# Patient Record
Sex: MALE | Race: WHITE
[De-identification: names, ages, dates, MRNs, and addresses within clinical notes are randomized per-mention and may not be internally consistent; named-entity substitution may affect disease eponyms.]

---

## 2021-08-04 ENCOUNTER — HOSPITAL ENCOUNTER (OUTPATIENT)
Dept: HOSPITAL 33 - ED | Age: 71
Setting detail: OBSERVATION
LOS: 3 days | Discharge: HOME | End: 2021-08-07
Attending: FAMILY MEDICINE | Admitting: FAMILY MEDICINE
Payer: OTHER GOVERNMENT

## 2021-08-04 DIAGNOSIS — J44.1: Primary | ICD-10-CM

## 2021-08-04 DIAGNOSIS — I10: ICD-10-CM

## 2021-08-04 DIAGNOSIS — E78.00: ICD-10-CM

## 2021-08-04 DIAGNOSIS — R09.02: ICD-10-CM

## 2021-08-04 DIAGNOSIS — C91.10: ICD-10-CM

## 2021-08-04 DIAGNOSIS — Z20.828: ICD-10-CM

## 2021-08-04 DIAGNOSIS — Z79.899: ICD-10-CM

## 2021-08-04 LAB
ALBUMIN SERPL-MCNC: 3.7 G/DL (ref 3.5–5)
ALP SERPL-CCNC: 84 U/L (ref 38–126)
ALT SERPL-CCNC: 42 U/L (ref 0–50)
ANION GAP SERPL CALC-SCNC: 9.8 MEQ/L (ref 5–15)
AST SERPL QL: 42 U/L (ref 17–59)
BILIRUB BLD-MCNC: 0.8 MG/DL (ref 0.2–1.3)
BNP SERPL-MCNC: 245 PG/ML (ref 0–900)
BUN SERPL-MCNC: 22 MG/DL (ref 9–20)
CALCIUM SPEC-MCNC: 9 MG/DL (ref 8.4–10.2)
CELLS COUNTED: 100
CHLORIDE SERPL-SCNC: 97 MMOL/L (ref 98–107)
CO2 SERPL-SCNC: 37 MMOL/L (ref 22–30)
CREAT SERPL-MCNC: 0.87 MG/DL (ref 0.66–1.25)
GFR SERPLBLD BASED ON 1.73 SQ M-ARVRAT: > 60 ML/MIN
GLUCOSE SERPL-MCNC: 148 MG/DL (ref 74–106)
HCT VFR BLD AUTO: 46.7 % (ref 42–50)
HGB BLD-MCNC: 14.6 GM/DL (ref 12.5–18)
INR PPP: 1.04 (ref 0.8–3)
MANUAL DIF COMMENT BLD-IMP: NORMAL
MCH RBC QN AUTO: 32.7 PG (ref 26–32)
MCHC RBC AUTO-ENTMCNC: 31.3 G/DL (ref 32–36)
PLATELET # BLD AUTO: 188 K/MM3 (ref 150–450)
POTASSIUM SERPLBLD-SCNC: 3.9 MMOL/L (ref 3.5–5.1)
PROT SERPL-MCNC: 6.3 G/DL (ref 6.3–8.2)
PROTHROMBIN TIME: 12.3 SECONDS (ref 9.4–12.5)
RBC # BLD AUTO: 4.46 M/MM3 (ref 4.1–5.6)
SODIUM SERPL-SCNC: 140 MMOL/L (ref 137–145)
WBC # BLD AUTO: 26 K/MM3 (ref 4–10.5)

## 2021-08-04 PROCEDURE — 36000 PLACE NEEDLE IN VEIN: CPT

## 2021-08-04 PROCEDURE — G0378 HOSPITAL OBSERVATION PER HR: HCPCS

## 2021-08-04 PROCEDURE — 80053 COMPREHEN METABOLIC PANEL: CPT

## 2021-08-04 PROCEDURE — 99285 EMERGENCY DEPT VISIT HI MDM: CPT

## 2021-08-04 PROCEDURE — 94760 N-INVAS EAR/PLS OXIMETRY 1: CPT

## 2021-08-04 PROCEDURE — 36415 COLL VENOUS BLD VENIPUNCTURE: CPT

## 2021-08-04 PROCEDURE — 99291 CRITICAL CARE FIRST HOUR: CPT

## 2021-08-04 PROCEDURE — 94762 N-INVAS EAR/PLS OXIMTRY CONT: CPT

## 2021-08-04 PROCEDURE — 85025 COMPLETE CBC W/AUTO DIFF WBC: CPT

## 2021-08-04 PROCEDURE — 93005 ELECTROCARDIOGRAM TRACING: CPT

## 2021-08-04 PROCEDURE — 85610 PROTHROMBIN TIME: CPT

## 2021-08-04 PROCEDURE — 93268 ECG RECORD/REVIEW: CPT

## 2021-08-04 PROCEDURE — 80048 BASIC METABOLIC PNL TOTAL CA: CPT

## 2021-08-04 PROCEDURE — U0003 INFECTIOUS AGENT DETECTION BY NUCLEIC ACID (DNA OR RNA); SEVERE ACUTE RESPIRATORY SYNDROME CORONAVIRUS 2 (SARS-COV-2) (CORONAVIRUS DISEASE [COVID-19]), AMPLIFIED PROBE TECHNIQUE, MAKING USE OF HIGH THROUGHPUT TECHNOLOGIES AS DESCRIBED BY CMS-2020-01-R: HCPCS

## 2021-08-04 PROCEDURE — 71045 X-RAY EXAM CHEST 1 VIEW: CPT

## 2021-08-04 PROCEDURE — 83605 ASSAY OF LACTIC ACID: CPT

## 2021-08-04 PROCEDURE — 83880 ASSAY OF NATRIURETIC PEPTIDE: CPT

## 2021-08-04 PROCEDURE — 84484 ASSAY OF TROPONIN QUANT: CPT

## 2021-08-04 PROCEDURE — 96374 THER/PROPH/DIAG INJ IV PUSH: CPT

## 2021-08-04 PROCEDURE — 94640 AIRWAY INHALATION TREATMENT: CPT

## 2021-08-04 PROCEDURE — 93041 RHYTHM ECG TRACING: CPT

## 2021-08-04 RX ADMIN — SIMVASTATIN SCH MG: 20 TABLET, FILM COATED ORAL at 23:46

## 2021-08-04 RX ADMIN — ALBUTEROL SULFATE SCH MG: 2.5 SOLUTION RESPIRATORY (INHALATION) at 22:10

## 2021-08-04 RX ADMIN — CARVEDILOL SCH MG: 12.5 TABLET, FILM COATED ORAL at 23:46

## 2021-08-04 RX ADMIN — PANTOPRAZOLE SODIUM SCH MG: 40 TABLET, DELAYED RELEASE ORAL at 23:46

## 2021-08-04 RX ADMIN — WATER SCH MG: 1 INJECTION INTRAMUSCULAR; INTRAVENOUS; SUBCUTANEOUS at 23:45

## 2021-08-04 NOTE — ERPHSYRPT
- History of Present Illness


Time Seen by Provider: 08/04/21 15:03


Source: patient, family


Exam Limitations: no limitations


Patient Subjective Stated Complaint: Pt states "I was at my Dr. office and he 

said I needed to come here."  "I have been a little short of breath."


Triage Nursing Assessment: Pt presented alert and oriented X 3, skin pwd Pt 

ambulates with a slow shuffling gait, able to speak in clear full sentences pt 

in no apparent respiratory distress.


Physician History: 





This is a 70-year-old white male has history of hypertension and end-stage COPD 

with an FEV1 of 19% who was at his pulmonologist office earlier today (Dr. Hamlin) for an evaluation.  Patient has been becoming more short of breath.  

With exertion, today, his oxygen saturation on his usual oxygen supplementation 

was in the 60s percentage.  When he is lying flat and still he was 97%.  Patient

is a [facility] patient but prefers to stay at [facility]. 

Patient refuses to go to the hospitals in [facility].  Patient has a 

history of CLL and frequently has abnormally high very high white blood cell 

counts.  Patient denies chest pain.  He denies fevers, he denies abdominal pain.

 He denies nausea vomiting diarrhea.


Timing/Duration: today


Activities at Onset: activity


Severity of Dyspnea-Max: moderate


Severity of Dyspnea-Current: moderate


Possible Cause: frequent episodes


Modifying Factors: Improves With: activity, exertion


Associated Symptoms: ankle swelling, No chest pain/discomfort


Allergies/Adverse Reactions: 








No Known Drug Allergies Allergy (Verified 08/04/21 14:55)


   





Home Medications: 








Albuterol Sulfate [Albuterol Sulfate Hfa] 8.5 gm IH DAILY 08/04/21 [History]


Budesonide/Formoterol Fumarate [Symbicort 80-4.5 Mcg Inhaler] 10.2 gm IH DAILY 

08/04/21 [History]


Furosemide 20 mg*** [Lasix 20 mg***] 20 mg PO DAILY 08/04/21 [History]


Prednisone 20 mg*** [Deltasone 20 mg***] 20 mg PO DAILY 08/04/21 [History]


lisinopriL [Lisinopril] 5 mg PO DAILY 08/04/21 [History]





Hx Tetanus, Diphtheria Vaccination/Date Given: No


Hx Influenza Vaccination/Date Given: No


Hx Pneumococcal Vaccination/Date Given: No


Immunizations Up to Date: Yes





Travel Risk





- International Travel


Have you traveled outside of the country in past 3 weeks: No





- Coronavirus Screening


Are you exhibiting any of the following symptoms?: No


Close contact with a COVID-19 positive Pt in past 14-21 Days: No





- Vaccine Status


Have you recieved a Covid-19 vaccination: Yes


: Moderna





- Vaccination Dates


Date of 2cond Vaccination (if applicable): 2021





- Review of Systems


Constitutional: No Symptoms


Eyes: No Symptoms


Ears, Nose, & Throat: No Symptoms


Respiratory: Dyspnea on Exertion (KAUR)


Cardiac: No Symptoms


Abdominal/Gastrointestinal: No Symptoms


Genitourinary Symptoms: No Symptoms


Musculoskeletal: No Symptoms


Skin: No Symptoms


Neurological: No Symptoms


Psychological: No Symptoms


Endocrine: No Symptoms


Hematologic/Lymphatic: No Symptoms


Immunological/Allergic: No Symptoms


All Other Systems: Reviewed and Negative





- Past Medical History


Pertinent Past Medical History: Yes


Neurological History: No Pertinent History


ENT History: No Pertinent History


Cardiac History: High Cholesterol, Hypertension, Myocardial Infarction (MI)


Respiratory History: Asthma, COPD, Emphysema


Endocrine Medical History: Hypoglycemia


Musculoskeletal History: No Pertinent History


GI Medical History: No Pertinent History


 History: Renal Disease


Psycho-Social History: No Pertinent History


Male Reproductive Disorders: No Pertinent History





- Past Surgical History


Past Surgical History: Yes


Other Surgical History: cardiac cath and stent placement.  hernia





- Social History


Smoking Status: Former smoker


Exposure to second hand smoke: Yes


Drug Use: none


Patient Lives Alone: No





- Nursing Vital Signs


Nursing Vital Signs: 


                               Initial Vital Signs











Temperature  97.8 F   08/04/21 14:49


 


Pulse Rate  94 H  08/04/21 14:49


 


Respiratory Rate  22   08/04/21 14:49


 


Blood Pressure  143/87   08/04/21 14:49


 


O2 Sat by Pulse Oximetry  97   08/04/21 14:49








                                   Pain Scale











Pain Intensity                 0

















- Physical Exam


General Appearance: mild distress, alert, anxiety


Eye Exam: PERRL/EOMI, eyes nml inspection


Ears, Nose, Throat Exam: hearing grossly normal, normal ENT inspection, normal 

pharynx


Neck Exam: normal inspection, non-tender, supple, full range of motion


Respiratory Exam: normal breath sounds, lungs clear, respiratory distress 

(Mild), airway intact, No chest tenderness


Cardiovascular/Chest Exam: normal heart sounds, regular rate/rhythm, normal 

peripheral pulses


Abdominal/Gastrointestinal Exam: soft, normal bowel sounds, No tenderness


Rectal Exam: not done


Extremity Exam: non-tender, normal range of motion, normal inspection


Neurologic Exam: alert, oriented x 3, cooperative, CNs II-XII nml as tested, 

normal mood/affect, nml cerebellar function, nml station & gait, sensation nml


Skin Exam: normal color, warm, dry


Lymphatic Exam: No adenopathy


**SpO2 Interpretation**: normal


SpO2: 97


O2 Delivery: Nasal Cannula





- Course


Nursing assessment & vital signs reviewed: Yes


EKG Interpreted by Me: RATE (90), Sinus Rhythm, NORMAL AXIS, NORMAL INTERVALS, 

NORMAL QRS, NORMAL ST-T, Other (No acute ischemic changes.  There are no 

comparison EKGs available for comparison.)


Ordered Tests: 


                               Active Orders 24 hr











 Category Date Time Status


 


 Cardiac Monitor STAT Care  08/04/21 15:28 Active


 


 EKG-ER Only STAT Care  08/04/21 15:27 Active


 


 IV Insertion STAT Care  08/04/21 15:27 Active


 


 Pulse Oximetry (ED) STAT Care  08/04/21 15:27 Active


 


 CHEST 1 VIEW (PORTABLE) Stat Exams  08/04/21 15:28 Completed


 


 CBC W DIFF Stat Lab  08/04/21 17:31 Completed


 


 CMP Stat Lab  08/04/21 15:20 Completed


 


 Lactic Acid Stat Lab  08/04/21 15:27 Completed


 


 Manual Differential NC Stat Lab  08/04/21 17:31 Completed


 


 NT PRO BNP Stat Lab  08/04/21 15:20 Completed


 


 PROTIME WITH INR Stat Lab  08/04/21 15:50 Completed


 


 TROPONIN Q3H Lab  08/04/21 15:20 Completed


 


 TROPONIN Q3H Lab  08/04/21 18:24 Received


 


 TROPONIN Q3H Lab  08/04/21 21:30 Ordered


 


 TROPONIN Q3H Lab  08/05/21 00:30 Ordered


 


 TROPONIN Q3H Lab  08/05/21 03:30 Ordered


 


 Transfer Order Routine Transfer  08/04/21 Ordered








Medication Summary














Discontinued Medications














Generic Name Dose Route Start Last Admin





  Trade Name Freq  PRN Reason Stop Dose Admin


 


Methylprednisolone Sodium  0 mg  08/04/21 15:27  08/04/21 15:33





Succinate 125 mg/ Sterile  IV  08/04/21 15:28  125 mg





Water 2 ml  STAT ONE   Administration


 


Methylprednisolone Sodium Succinate  Confirm  08/04/21 15:32 





  Solu-Medrol  Administered  08/04/21 15:33 





  Dose  





  125 mg  





  .ROUTE  





  .STK-MED ONE  


 


Sterile Water  Confirm  08/04/21 15:32 





  Sterile H2o 10 Ml  Administered  08/04/21 15:33 





  Dose  





  10 ml  





  IJ  





  .K-MED ONE  











Lab/Rad Data: 


                           Laboratory Result Diagrams





                                 08/04/21 17:31 





                                 08/04/21 15:20 





                               Laboratory Results











  08/04/21 08/04/21 08/04/21 Range/Units





  17:31 16:32 15:50 


 


WBC  26.0 H*    (4.0-10.5)  K/mm3


 


RBC  4.46    (4.1-5.6)  M/mm3


 


Hgb  14.6    (12.5-18.0)  gm/dl


 


Hct  46.7    (42-50)  %


 


MCV  104.7 H    ()  fl


 


MCH  32.7 H    (26-32)  pg


 


MCHC  31.3 L    (32-36)  g/dl


 


RDW  13.3    (11.5-14.0)  %


 


Plt Count  188    (150-450)  K/mm3


 


MPV  9.1    (7.5-11.0)  fl


 


PT    12.3  (9.4-12.5)  SECONDS


 


INR    1.04  (0.8-3.0)  


 


Sodium     (137-145)  mmol/L


 


Potassium     (3.5-5.1)  mmol/L


 


Chloride     ()  mmol/L


 


Carbon Dioxide     (22-30)  mmol/L


 


Anion Gap     (5-15)  MEQ/L


 


BUN     (9-20)  mg/dL


 


Creatinine     (0.66-1.25)  mg/dL


 


Estimated GFR     ML/MIN


 


Glucose     ()  mg/dL


 


Lactic Acid     (0.4-2.0)  


 


Calcium     (8.4-10.2)  mg/dL


 


Total Bilirubin     (0.2-1.3)  mg/dL


 


AST     (17-59)  U/L


 


ALT     (0-50)  U/L


 


Alkaline Phosphatase     ()  U/L


 


Troponin I     (0.000-0.034)  ng/mL


 


NT-Pro-B Natriuret Pep     (0-900)  pg/mL


 


Serum Total Protein     (6.3-8.2)  g/dL


 


Albumin     (3.5-5.0)  g/dL


 


SARS-CoV-2 (PCR)   NEGATIVE   (NEGATIVE)  














  08/04/21 08/04/21 08/04/21 Range/Units





  15:27 15:20 15:20 


 


WBC     (4.0-10.5)  K/mm3


 


RBC     (4.1-5.6)  M/mm3


 


Hgb     (12.5-18.0)  gm/dl


 


Hct     (42-50)  %


 


MCV     ()  fl


 


MCH     (26-32)  pg


 


MCHC     (32-36)  g/dl


 


RDW     (11.5-14.0)  %


 


Plt Count     (150-450)  K/mm3


 


MPV     (7.5-11.0)  fl


 


PT     (9.4-12.5)  SECONDS


 


INR     (0.8-3.0)  


 


Sodium    140  (137-145)  mmol/L


 


Potassium    3.9  (3.5-5.1)  mmol/L


 


Chloride    97 L  ()  mmol/L


 


Carbon Dioxide    37 H  (22-30)  mmol/L


 


Anion Gap    9.8  (5-15)  MEQ/L


 


BUN    22 H  (9-20)  mg/dL


 


Creatinine    0.87  (0.66-1.25)  mg/dL


 


Estimated GFR    > 60.0  ML/MIN


 


Glucose    148 H  ()  mg/dL


 


Lactic Acid  1.2    (0.4-2.0)  


 


Calcium    9.0  (8.4-10.2)  mg/dL


 


Total Bilirubin    0.80  (0.2-1.3)  mg/dL


 


AST    42  (17-59)  U/L


 


ALT    42  (0-50)  U/L


 


Alkaline Phosphatase    84  ()  U/L


 


Troponin I   < 0.012   (0.000-0.034)  ng/mL


 


NT-Pro-B Natriuret Pep    245  (0-900)  pg/mL


 


Serum Total Protein    6.3  (6.3-8.2)  g/dL


 


Albumin    3.7  (3.5-5.0)  g/dL


 


SARS-CoV-2 (PCR)     (NEGATIVE)  














- Progress


Progress: improved, re-examined


Air Movement: good


Progress Note: 





08/04/21 16:56


Chest x-ray shows COPD changes.  There are no acute cardiopulmonary processes 

present.


Blood Culture(s) Obtained: No


Antibiotics given: No


Discussed with Dr.: Taylor


Counseled pt/family regarding: lab results, diagnosis, rad results





- Departure


Departure Disposition: Observation


Clinical Impression: 


 COPD with acute exacerbation, Hypoxia





Condition: Stable


Critical Care Time: Yes


Critical Care Time(excluding separately billable procedures): Critical 30-74 

mins


Referrals: 


WILLIE FLETCHER [NON-STAFF PHY W/O PRIVILEGES] - 


Instructions:  Chronic Obstructive Pulmonary Disease

## 2021-08-04 NOTE — XRAY
Exam: AP upright portable chest film from 8/4/2021.



Indication: 70-year-old male with shortness of breath, no history of prior

surgery.



Findings: The transverse heart size is normal.  Some atherosclerotic vascular

calcification is seen within the aortic arch.  The keyur and mediastinal

structures are remarkable for some mild chronic central bronchial wall

thickening, perhaps secondary to COPD.



The lungs are hyperinflated.  I note significant emphysematous changes

throughout the upper two thirds of the right lung field and at the left lung

apex.  No air space infiltrates, pneumothorax, or pleural effusion is seen.

There is mild biapical pleural thickening.  Old healed rib fracture

deformities are seen posteriorly involving the right fourth and fifth ribs.

No acute osseous process is seen.



Impression:



1.  Hyperinflation of the lung fields and significant centrilobular

emphysematous changes in both upper lung fields, right greater than left.

Central bronchial wall thickening is probably on the basis of COPD.



2.  No air space infiltrates or other acute cardiopulmonary disease is seen.

## 2021-08-05 LAB
ALBUMIN SERPL-MCNC: 3.5 G/DL (ref 3.5–5)
ALP SERPL-CCNC: 80 U/L (ref 38–126)
ALT SERPL-CCNC: 36 U/L (ref 0–50)
ANION GAP SERPL CALC-SCNC: 9.2 MEQ/L (ref 5–15)
AST SERPL QL: 26 U/L (ref 17–59)
BILIRUB BLD-MCNC: 0.5 MG/DL (ref 0.2–1.3)
BNP SERPL-MCNC: 343 PG/ML (ref 0–900)
BUN SERPL-MCNC: 23 MG/DL (ref 9–20)
CALCIUM SPEC-MCNC: 9.1 MG/DL (ref 8.4–10.2)
CELLS COUNTED: 100
CHLORIDE SERPL-SCNC: 97 MMOL/L (ref 98–107)
CO2 SERPL-SCNC: 36 MMOL/L (ref 22–30)
CREAT SERPL-MCNC: 0.74 MG/DL (ref 0.66–1.25)
GFR SERPLBLD BASED ON 1.73 SQ M-ARVRAT: > 60 ML/MIN
GLUCOSE SERPL-MCNC: 148 MG/DL (ref 74–106)
HCT VFR BLD AUTO: 44 % (ref 42–50)
HGB BLD-MCNC: 14 GM/DL (ref 12.5–18)
MANUAL DIF COMMENT BLD-IMP: NORMAL
MCH RBC QN AUTO: 32.7 PG (ref 26–32)
MCHC RBC AUTO-ENTMCNC: 31.8 G/DL (ref 32–36)
NEUTS BAND # BLD MANUAL: 1 % (ref 0–2)
PLATELET # BLD AUTO: 179 K/MM3 (ref 150–450)
POTASSIUM SERPLBLD-SCNC: 4.6 MMOL/L (ref 3.5–5.1)
PROT SERPL-MCNC: 5.8 G/DL (ref 6.3–8.2)
RBC # BLD AUTO: 4.28 M/MM3 (ref 4.1–5.6)
SODIUM SERPL-SCNC: 137 MMOL/L (ref 137–145)
TOXIC GRANULES BLD QL SMEAR: (no result)
WBC # BLD AUTO: 26.4 K/MM3 (ref 4–10.5)

## 2021-08-05 RX ADMIN — ALBUTEROL SULFATE SCH MG: 2.5 SOLUTION RESPIRATORY (INHALATION) at 13:10

## 2021-08-05 RX ADMIN — LISINOPRIL SCH MG: 5 TABLET ORAL at 11:27

## 2021-08-05 RX ADMIN — FLUTICASONE PROPIONATE AND SALMETEROL XINAFOATE SCH PUFF: 115; 21 AEROSOL, METERED RESPIRATORY (INHALATION) at 06:42

## 2021-08-05 RX ADMIN — PANTOPRAZOLE SODIUM SCH MG: 40 TABLET, DELAYED RELEASE ORAL at 21:13

## 2021-08-05 RX ADMIN — CARVEDILOL SCH MG: 12.5 TABLET, FILM COATED ORAL at 21:13

## 2021-08-05 RX ADMIN — SIMVASTATIN SCH MG: 20 TABLET, FILM COATED ORAL at 21:14

## 2021-08-05 RX ADMIN — FUROSEMIDE SCH MG: 20 TABLET ORAL at 11:27

## 2021-08-05 RX ADMIN — CARVEDILOL SCH MG: 12.5 TABLET, FILM COATED ORAL at 09:38

## 2021-08-05 RX ADMIN — WATER SCH MG: 1 INJECTION INTRAMUSCULAR; INTRAVENOUS; SUBCUTANEOUS at 09:38

## 2021-08-05 RX ADMIN — WATER SCH MG: 1 INJECTION INTRAMUSCULAR; INTRAVENOUS; SUBCUTANEOUS at 17:42

## 2021-08-05 RX ADMIN — ASPIRIN SCH MG: 81 TABLET, COATED ORAL at 11:27

## 2021-08-05 RX ADMIN — MAGNESIUM OXIDE TAB 400 MG (241.3 MG ELEMENTAL MG) SCH MG: 400 (241.3 MG) TAB at 11:27

## 2021-08-05 RX ADMIN — ALBUTEROL SULFATE SCH MG: 2.5 SOLUTION RESPIRATORY (INHALATION) at 19:55

## 2021-08-05 RX ADMIN — AZITHROMYCIN DIHYDRATE SCH MLS/HR: 500 INJECTION, POWDER, LYOPHILIZED, FOR SOLUTION INTRAVENOUS at 10:31

## 2021-08-05 RX ADMIN — ALBUTEROL SULFATE SCH MG: 2.5 SOLUTION RESPIRATORY (INHALATION) at 06:42

## 2021-08-05 RX ADMIN — CEFTRIAXONE SCH MLS/HR: 1 INJECTION, SOLUTION INTRAVENOUS at 09:41

## 2021-08-05 RX ADMIN — WATER SCH: 1 INJECTION INTRAMUSCULAR; INTRAVENOUS; SUBCUTANEOUS at 10:45

## 2021-08-05 RX ADMIN — PANTOPRAZOLE SODIUM SCH MG: 40 TABLET, DELAYED RELEASE ORAL at 09:38

## 2021-08-05 RX ADMIN — FLUTICASONE PROPIONATE AND SALMETEROL XINAFOATE SCH PUFF: 115; 21 AEROSOL, METERED RESPIRATORY (INHALATION) at 19:55

## 2021-08-05 NOTE — PCM.HP
History of Present Illness





- Chief Complaint


Chief Complaint: COPD with exacerbation


History of Present Illness: 


 is a 70 year old male [facility] patient that follows with Dr Hamlin, he 

has increasing cough, nonproductive and shortness of breath, oxygen drops with 

exertion significantly. he has no fever, symptoms are intermittent with severe 

shortness of breath and hypoxia with exertion.








- Review of Systems


Respiratory: Cough, Short Of Breath


Cardiac: No Chest Pain, No Edema, No Syncope


Abdominal/Gastrointestinal: No Abdominal Pain, No Nausea, No Vomiting, No 

Diarrhea


Genitourinary Symptoms: No Dysuria


Skin: No Rash


All Other Systems: Reviewed and Negative





Medications & Allergies


Home Medications: 


                              Home Medication List





Albuterol Sulfate [Albuterol Sulfate Hfa] 8.5 gm IH Q6H 08/04/21 [History 

Confirmed 08/04/21]


Aspirin EC 81 mg*** [Ecotrin 81 mg***] 81 mg PO DAILY 08/04/21 [History 

Confirmed 08/04/21]


Atorvastatin Calcium 80 mg PO HS 08/04/21 [History Confirmed 08/04/21]


Budesonide/Formoterol Fumarate [Symbicort 80-4.5 Mcg Inhaler] 10.2 gm IH DAILY 

08/04/21 [History Confirmed 08/04/21]


Carvedilol 12.5 mg*** [Coreg 12.5 mg***] 12.5 mg PO BID 08/04/21 [History 

Confirmed 08/04/21]


Cholecalciferol (Vitamin D3) [Vitamin D3] 25 mcg PO DAILY 08/04/21 [History 

Confirmed 08/04/21]


Furosemide 20 mg*** [Lasix 20 mg***] 20 mg PO DAILY 08/04/21 [History Confirmed 

08/04/21]


Magnesium Oxide [Magnesium] 400 mg PO DAILY 08/04/21 [History Confirmed 

08/04/21]


Omeprazole 20 mg PO BIDWM 08/04/21 [History Confirmed 08/04/21]


Prednisone 20 mg*** [Deltasone 20 mg***] 20 mg PO DAILY 08/04/21 [History 

Confirmed 08/04/21]


Promethazine HCl 25 mg*** [Phenergan 25 mg***] 12.5 mg PO Q6H PRN 08/04/21 

[History Confirmed 08/04/21]


lisinopriL [Lisinopril] 5 mg PO DAILY 08/04/21 [History Confirmed 08/04/21]








Allergies/Adverse Reactions: 


                                    Allergies











Allergy/AdvReac Type Severity Reaction Status Date / Time


 


No Known Drug Allergies Allergy   Verified 08/04/21 14:55














- Past Medical History


Past Medical History: Yes


Neurological History: No Pertinent History


ENT History: No Pertinent History


Cardiac History: High Cholesterol, Hypertension, Myocardial Infarction (MI)


Respiratory History: COPD, Emphysema


Endocrine Medical History: Hypoglycemia


Musculoskelatal History: No Pertinent History


GI Medical History: No Pertinent History


 History: No Pertinent History


Pyscho-Social History: No Pertinent History


Male Reproductive Disorders: No Pertinent History





- Past Surgical History


Past Surgical History: Yes


Neuro Surgical History: No Pertinent History


Cardiac History: Cardiac Stent


Respiratory Surgery: No Pertinent History


GI Surgical History: Hernia Repair


Genitourinary Surgical Hx: No Pertinent History


Musculskeletal Surgical Hx: No Pertinent History


Male Surgical History: No Pertinent History


Other Surgical History: cardiac cath and stent placement.  hernia





- Social History


Smoking Status: Former smoker


Exposure to second hand smoke: Yes


Alcohol: None


Drug Use: none





- Physical Exam


Vital Signs: 


                               Vital Signs - 24 hr











  Temp Pulse Resp BP Pulse Ox


 


 08/05/21 07:03  98.2 F  88  22  111/56  97


 


 08/05/21 06:45   88  20   95


 


 08/05/21 04:15  98.1 F  76  18  121/67  99


 


 08/04/21 23:34  97.5 F  102 H  19  139/88  95


 


 08/04/21 22:10   105 H  20   95


 


 08/04/21 20:25  97.4 F  101 H  26 H  138/76  96


 


 08/04/21 18:47      97


 


 08/04/21 18:12   79  18  117/78  98


 


 08/04/21 17:06   79  15  114/70  98


 


 08/04/21 16:02   80   


 


 08/04/21 15:55  97.8 F  87  20  126/88  98


 


 08/04/21 15:30      95


 


 08/04/21 14:49  97.8 F  94 H  22  143/87  97











General Appearance: no apparent distress


Neurologic Exam: alert, oriented x 3


Respiratory Exam: prolonged expirations, rhonchi, wheezing


Cardiovascular Exam: regular rate/rhythm, normal heart sounds, normal peripheral

pulses


Gastrointestinal/Abdomen Exam: soft, normal bowel sounds, No tenderness, No mass


Extremity Exam: normal inspection, normal range of motion, pelvis stable





Results





- Labs


Lab/Micro Results: 


                            Lab Results-Last 24 Hours











  08/04/21 08/04/21 08/04/21 Range/Units





  15:20 15:20 15:27 


 


WBC     (4.0-10.5)  K/mm3


 


RBC     (4.1-5.6)  M/mm3


 


Hgb     (12.5-18.0)  gm/dl


 


Hct     (42-50)  %


 


MCV     ()  fl


 


MCH     (26-32)  pg


 


MCHC     (32-36)  g/dl


 


RDW     (11.5-14.0)  %


 


Plt Count     (150-450)  K/mm3


 


MPV     (7.5-11.0)  fl


 


Segmented Neutrophils     (36.-66.)  %


 


Lymphocytes (Manual)     (24-44)  %


 


Monocytes (Manual)     (0.0-12.0)  %


 


Platelet Estimate     (NORMAL)  


 


RBC Morphology     


 


Smear Path Review     


 


PT     (9.4-12.5)  SECONDS


 


INR     (0.8-3.0)  


 


Sodium  140    (137-145)  mmol/L


 


Potassium  3.9    (3.5-5.1)  mmol/L


 


Chloride  97 L    ()  mmol/L


 


Carbon Dioxide  37 H    (22-30)  mmol/L


 


Anion Gap  9.8    (5-15)  MEQ/L


 


BUN  22 H    (9-20)  mg/dL


 


Creatinine  0.87    (0.66-1.25)  mg/dL


 


Estimated GFR  > 60.0    ML/MIN


 


Glucose  148 H    ()  mg/dL


 


Lactic Acid    1.2  (0.4-2.0)  


 


Calcium  9.0    (8.4-10.2)  mg/dL


 


Total Bilirubin  0.80    (0.2-1.3)  mg/dL


 


AST  42    (17-59)  U/L


 


ALT  42    (0-50)  U/L


 


Alkaline Phosphatase  84    ()  U/L


 


Troponin I   < 0.012   (0.000-0.034)  ng/mL


 


NT-Pro-B Natriuret Pep  245    (0-900)  pg/mL


 


Serum Total Protein  6.3    (6.3-8.2)  g/dL


 


Albumin  3.7    (3.5-5.0)  g/dL


 


SARS-CoV-2 (PCR)     (NEGATIVE)  














  08/04/21 08/04/21 08/04/21 Range/Units





  15:50 16:32 17:31 


 


WBC    26.0 H*  (4.0-10.5)  K/mm3


 


RBC    4.46  (4.1-5.6)  M/mm3


 


Hgb    14.6  (12.5-18.0)  gm/dl


 


Hct    46.7  (42-50)  %


 


MCV    104.7 H  ()  fl


 


MCH    32.7 H  (26-32)  pg


 


MCHC    31.3 L  (32-36)  g/dl


 


RDW    13.3  (11.5-14.0)  %


 


Plt Count    188  (150-450)  K/mm3


 


MPV    9.1  (7.5-11.0)  fl


 


Segmented Neutrophils    38  (36.-66.)  %


 


Lymphocytes (Manual)    59 H  (24-44)  %


 


Monocytes (Manual)    3  (0.0-12.0)  %


 


Platelet Estimate    NORMAL  (NORMAL)  


 


RBC Morphology    NORMAL  


 


Smear Path Review    Pending  


 


PT  12.3    (9.4-12.5)  SECONDS


 


INR  1.04    (0.8-3.0)  


 


Sodium     (137-145)  mmol/L


 


Potassium     (3.5-5.1)  mmol/L


 


Chloride     ()  mmol/L


 


Carbon Dioxide     (22-30)  mmol/L


 


Anion Gap     (5-15)  MEQ/L


 


BUN     (9-20)  mg/dL


 


Creatinine     (0.66-1.25)  mg/dL


 


Estimated GFR     ML/MIN


 


Glucose     ()  mg/dL


 


Lactic Acid     (0.4-2.0)  


 


Calcium     (8.4-10.2)  mg/dL


 


Total Bilirubin     (0.2-1.3)  mg/dL


 


AST     (17-59)  U/L


 


ALT     (0-50)  U/L


 


Alkaline Phosphatase     ()  U/L


 


Troponin I     (0.000-0.034)  ng/mL


 


NT-Pro-B Natriuret Pep     (0-900)  pg/mL


 


Serum Total Protein     (6.3-8.2)  g/dL


 


Albumin     (3.5-5.0)  g/dL


 


SARS-CoV-2 (PCR)   NEGATIVE   (NEGATIVE)  














  08/04/21 08/04/21 08/05/21 Range/Units





  18:24 21:57 00:20 


 


WBC     (4.0-10.5)  K/mm3


 


RBC     (4.1-5.6)  M/mm3


 


Hgb     (12.5-18.0)  gm/dl


 


Hct     (42-50)  %


 


MCV     ()  fl


 


MCH     (26-32)  pg


 


MCHC     (32-36)  g/dl


 


RDW     (11.5-14.0)  %


 


Plt Count     (150-450)  K/mm3


 


MPV     (7.5-11.0)  fl


 


Segmented Neutrophils     (36.-66.)  %


 


Lymphocytes (Manual)     (24-44)  %


 


Monocytes (Manual)     (0.0-12.0)  %


 


Platelet Estimate     (NORMAL)  


 


RBC Morphology     


 


Smear Path Review     


 


PT     (9.4-12.5)  SECONDS


 


INR     (0.8-3.0)  


 


Sodium     (137-145)  mmol/L


 


Potassium     (3.5-5.1)  mmol/L


 


Chloride     ()  mmol/L


 


Carbon Dioxide     (22-30)  mmol/L


 


Anion Gap     (5-15)  MEQ/L


 


BUN     (9-20)  mg/dL


 


Creatinine     (0.66-1.25)  mg/dL


 


Estimated GFR     ML/MIN


 


Glucose     ()  mg/dL


 


Lactic Acid     (0.4-2.0)  


 


Calcium     (8.4-10.2)  mg/dL


 


Total Bilirubin     (0.2-1.3)  mg/dL


 


AST     (17-59)  U/L


 


ALT     (0-50)  U/L


 


Alkaline Phosphatase     ()  U/L


 


Troponin I  < 0.012  < 0.012  < 0.012  (0.000-0.034)  ng/mL


 


NT-Pro-B Natriuret Pep     (0-900)  pg/mL


 


Serum Total Protein     (6.3-8.2)  g/dL


 


Albumin     (3.5-5.0)  g/dL


 


SARS-CoV-2 (PCR)     (NEGATIVE)  














  08/05/21 08/05/21 08/05/21 Range/Units





  04:28 04:28 04:28 


 


WBC   26.4 H*   (4.0-10.5)  K/mm3


 


RBC   4.28   (4.1-5.6)  M/mm3


 


Hgb   14.0   (12.5-18.0)  gm/dl


 


Hct   44.0   (42-50)  %


 


MCV   102.8 H   ()  fl


 


MCH   32.7 H   (26-32)  pg


 


MCHC   31.8 L   (32-36)  g/dl


 


RDW   12.9   (11.5-14.0)  %


 


Plt Count   179   (150-450)  K/mm3


 


MPV   8.9   (7.5-11.0)  fl


 


Segmented Neutrophils     (36.-66.)  %


 


Lymphocytes (Manual)     (24-44)  %


 


Monocytes (Manual)     (0.0-12.0)  %


 


Platelet Estimate     (NORMAL)  


 


RBC Morphology     


 


Smear Path Review     


 


PT     (9.4-12.5)  SECONDS


 


INR     (0.8-3.0)  


 


Sodium    137  (137-145)  mmol/L


 


Potassium    4.6  (3.5-5.1)  mmol/L


 


Chloride    97 L  ()  mmol/L


 


Carbon Dioxide    36 H  (22-30)  mmol/L


 


Anion Gap    9.2  (5-15)  MEQ/L


 


BUN    23 H  (9-20)  mg/dL


 


Creatinine    0.74  (0.66-1.25)  mg/dL


 


Estimated GFR    > 60.0  ML/MIN


 


Glucose    148 H  ()  mg/dL


 


Lactic Acid     (0.4-2.0)  


 


Calcium    9.1  (8.4-10.2)  mg/dL


 


Total Bilirubin    0.50  (0.2-1.3)  mg/dL


 


AST    26  (17-59)  U/L


 


ALT    36  (0-50)  U/L


 


Alkaline Phosphatase    80  ()  U/L


 


Troponin I  < 0.012    (0.000-0.034)  ng/mL


 


NT-Pro-B Natriuret Pep    343  (0-900)  pg/mL


 


Serum Total Protein    5.8 L  (6.3-8.2)  g/dL


 


Albumin    3.5  (3.5-5.0)  g/dL


 


SARS-CoV-2 (PCR)     (NEGATIVE)  














- Radiology Impressions


Radiology Exams & Impressions: 


                              Radiology Procedures











 Category Date Time Status


 


 CHEST 1 VIEW (PORTABLE) Stat Exams  08/04/21 15:28 Completed














- Other Procedures and Tests


                               Respiratory Therapy





08/04/21 20:11


Oxygen Nasal Cannula 3 lpm 





08/04/21 22:45


Respiratory Therapy Assessment DAILY 














Assessment/Plan


(1) COPD with acute exacerbation


Current Visit: Yes   Status: Acute   


Assessment & Plan: 


IV solu medrol, nebs and rocephin/zithromax ordered


Code(s): J44.1 - CHRONIC OBSTRUCTIVE PULMONARY DISEASE W (ACUTE) EXACERBATION   





(2) Hypoxia


Current Visit: Yes   Status: Acute   Code(s): R09.02 - HYPOXEMIA   





(3) CLL (chronic lymphocytic leukemia)


Current Visit: Yes   Status: Acute   


Assessment & Plan: 


chronic elevation of WBC


Code(s): C91.10 - CHRONIC LYMPHOCYTIC LEUK OF B-CELL TYPE NOT ACHIEVE REMIS

## 2021-08-06 LAB
ANION GAP SERPL CALC-SCNC: 10.8 MEQ/L (ref 5–15)
BASOPHILS # BLD AUTO: 0.02 10*3/UL (ref 0–0.4)
BASOPHILS NFR BLD AUTO: 0.1 % (ref 0–0.4)
BLD SMEAR INTERP: YES
BUN SERPL-MCNC: 25 MG/DL (ref 9–20)
CALCIUM SPEC-MCNC: 8.8 MG/DL (ref 8.4–10.2)
CHLORIDE SERPL-SCNC: 98 MMOL/L (ref 98–107)
CO2 SERPL-SCNC: 30 MMOL/L (ref 22–30)
CREAT SERPL-MCNC: 0.61 MG/DL (ref 0.66–1.25)
EOSINOPHIL # BLD AUTO: 0 10*3/UL (ref 0–0.5)
GFR SERPLBLD BASED ON 1.73 SQ M-ARVRAT: > 60 ML/MIN
GLUCOSE SERPL-MCNC: 228 MG/DL (ref 74–106)
HCT VFR BLD AUTO: 42.3 % (ref 42–50)
HGB BLD-MCNC: 13.6 GM/DL (ref 12.5–18)
LYMPHOCYTES # SPEC AUTO: 22.7 10*3/UL (ref 1–4.6)
MCH RBC QN AUTO: 33.3 PG (ref 26–32)
MCHC RBC AUTO-ENTMCNC: 32.2 G/DL (ref 32–36)
MONOCYTES # BLD AUTO: 0.39 10*3/UL (ref 0–1.3)
PLATELET # BLD AUTO: 183 K/MM3 (ref 150–450)
POTASSIUM SERPLBLD-SCNC: 4.1 MMOL/L (ref 3.5–5.1)
RBC # BLD AUTO: 4.09 M/MM3 (ref 4.1–5.6)
SODIUM SERPL-SCNC: 134 MMOL/L (ref 137–145)
WBC # BLD AUTO: 35.9 K/MM3 (ref 4–10.5)

## 2021-08-06 RX ADMIN — FLUTICASONE PROPIONATE AND SALMETEROL XINAFOATE SCH PUFF: 115; 21 AEROSOL, METERED RESPIRATORY (INHALATION) at 06:47

## 2021-08-06 RX ADMIN — CARVEDILOL SCH MG: 12.5 TABLET, FILM COATED ORAL at 20:58

## 2021-08-06 RX ADMIN — FLUTICASONE PROPIONATE AND SALMETEROL XINAFOATE SCH PUFF: 115; 21 AEROSOL, METERED RESPIRATORY (INHALATION) at 20:00

## 2021-08-06 RX ADMIN — WATER SCH MG: 1 INJECTION INTRAMUSCULAR; INTRAVENOUS; SUBCUTANEOUS at 09:07

## 2021-08-06 RX ADMIN — LISINOPRIL SCH MG: 5 TABLET ORAL at 09:07

## 2021-08-06 RX ADMIN — FUROSEMIDE SCH MG: 20 TABLET ORAL at 09:07

## 2021-08-06 RX ADMIN — SIMVASTATIN SCH MG: 20 TABLET, FILM COATED ORAL at 20:58

## 2021-08-06 RX ADMIN — AZITHROMYCIN DIHYDRATE SCH MLS/HR: 500 INJECTION, POWDER, LYOPHILIZED, FOR SOLUTION INTRAVENOUS at 09:06

## 2021-08-06 RX ADMIN — CEFTRIAXONE SCH MLS/HR: 1 INJECTION, SOLUTION INTRAVENOUS at 09:06

## 2021-08-06 RX ADMIN — MAGNESIUM OXIDE TAB 400 MG (241.3 MG ELEMENTAL MG) SCH MG: 400 (241.3 MG) TAB at 09:07

## 2021-08-06 RX ADMIN — CARVEDILOL SCH MG: 12.5 TABLET, FILM COATED ORAL at 09:07

## 2021-08-06 RX ADMIN — ALBUTEROL SULFATE SCH MG: 2.5 SOLUTION RESPIRATORY (INHALATION) at 20:00

## 2021-08-06 RX ADMIN — PANTOPRAZOLE SODIUM SCH MG: 40 TABLET, DELAYED RELEASE ORAL at 09:07

## 2021-08-06 RX ADMIN — WATER SCH MG: 1 INJECTION INTRAMUSCULAR; INTRAVENOUS; SUBCUTANEOUS at 01:13

## 2021-08-06 RX ADMIN — ALBUTEROL SULFATE SCH MG: 2.5 SOLUTION RESPIRATORY (INHALATION) at 13:13

## 2021-08-06 RX ADMIN — ALBUTEROL SULFATE SCH: 2.5 SOLUTION RESPIRATORY (INHALATION) at 07:24

## 2021-08-06 RX ADMIN — ENOXAPARIN SODIUM SCH MG: 100 INJECTION SUBCUTANEOUS at 09:07

## 2021-08-06 RX ADMIN — ALBUTEROL SULFATE SCH MG: 2.5 SOLUTION RESPIRATORY (INHALATION) at 06:47

## 2021-08-06 RX ADMIN — PANTOPRAZOLE SODIUM SCH MG: 40 TABLET, DELAYED RELEASE ORAL at 20:59

## 2021-08-06 RX ADMIN — ASPIRIN SCH MG: 81 TABLET, COATED ORAL at 09:07

## 2021-08-06 RX ADMIN — TIOTROPIUM BROMIDE SCH EA: 18 CAPSULE ORAL; RESPIRATORY (INHALATION) at 06:47

## 2021-08-06 RX ADMIN — WATER SCH MG: 1 INJECTION INTRAMUSCULAR; INTRAVENOUS; SUBCUTANEOUS at 17:49

## 2021-08-06 NOTE — PCM.NOTE
Date and Time: 08/06/21  0841





Subjective Assessment: 





patient is doing well today, he is comfortable with his breathing, only has 

trouble with exertion.





Objective Exam


General Appearance: no apparent distress, alert


Skin Exam: normal color, warm, dry


Respiratory Exam: diminished breath sounds, prolonged expirations, rhonchi


Cardiovascular Exam: regular rate/rhythm, normal heart sounds


Gastrointestinal/Abdomen Exam: soft, No tenderness, No mass


Extremity Exam: normal inspection, normal range of motion





OBJECTIVE DATA


Vital Signs: 


                               Vital Signs - 24 hr











  Temp Pulse Resp BP Pulse Ox


 


 08/06/21 06:50   76  20   96


 


 08/06/21 04:00  98.2 F  98 H  19  123/86  98


 


 08/05/21 23:42  98.2 F  86  22  113/66  94 L


 


 08/05/21 19:55   86  16   96


 


 08/05/21 19:35  98.4 F  101 H  18  136/79  93 L


 


 08/05/21 15:53  98.2 F  100 H  22  105/57  95


 


 08/05/21 13:12   83  22   98


 


 08/05/21 12:00  98.1 F  101 H  21  142/85  96








                        Pain Assessment - Last Documented











Pain Intensity                 0











Intake and Output: 


                                 Intake & Output











 08/03/21 08/04/21 08/05/21 08/06/21





 11:59 11:59 11:59 11:59


 


Intake Total   1260 1860


 


Output Total   700 2400


 


Balance   560 -540


 


Weight   86.5 kg 











Lab Results: 


                            Lab Results-Last 24 Hours











  08/05/21 08/06/21 08/06/21 Range/Units





  04:28 04:27 04:27 


 


WBC   35.9 H*   (4.0-10.5)  K/mm3


 


RBC   4.09 L   (4.1-5.6)  M/mm3


 


Hgb   13.6   (12.5-18.0)  gm/dl


 


Hct   42.3   (42-50)  %


 


MCV   103.4 H   ()  fl


 


MCH   33.3 H   (26-32)  pg


 


MCHC   32.2   (32-36)  g/dl


 


RDW   12.8   (11.5-14.0)  %


 


Plt Count   183   (150-450)  K/mm3


 


MPV   9.0   (7.5-11.0)  fl


 


Gran %   35.5 L   (36.0-66.0)  %


 


Eos # (Auto)   0   (0-0.5)  


 


Absolute Lymphs (auto)   22.70 H   (1.0-4.6)  


 


Absolute Monos (auto)   0.39   (0.0-1.3)  


 


Lymphocytes %   63.3 H   (24.0-44.0)  %


 


Monocytes %   1.1   (0.0-12.0)  %


 


Eosinophils %   0.0   (0.00-5.0)  %


 


Basophils %   0.1   (0.0-0.4)  %


 


Absolute Granulocytes   12.74 H   (1.4-6.9)  


 


Segmented Neutrophils  29 L    (36.-66.)  %


 


Band Neutrophils  1    (0.0-2.0)  %


 


Lymphocytes (Manual)  70 H    (24-44)  %


 


Basophils #   0.02   (0-0.4)  


 


Toxic Granulation  1+    


 


Platelet Estimate  NORMAL    (NORMAL)  


 


RBC Morphology  NORMAL    


 


Sodium    134 L  (137-145)  mmol/L


 


Potassium    4.1  (3.5-5.1)  mmol/L


 


Chloride    98  ()  mmol/L


 


Carbon Dioxide    30  (22-30)  mmol/L


 


Anion Gap    10.8  (5-15)  MEQ/L


 


BUN    25 H  (9-20)  mg/dL


 


Creatinine    0.61 L  (0.66-1.25)  mg/dL


 


Estimated GFR    > 60.0  ML/MIN


 


Glucose    228 H  ()  mg/dL


 


Calcium    8.8  (8.4-10.2)  mg/dL


 


Slides for Path Review   YES   











Radiology Exams: 


                              Radiology Procedures











 Category Date Time Status


 


 CHEST 1 VIEW (PORTABLE) Stat Exams  08/04/21 15:28 Completed














Assessment/Plan


(1) COPD with acute exacerbation


Current Visit: Yes   Status: Acute   


Assessment & Plan: 


continue rocephin/zithromax, solu medrol and nebulizer therapy.


Code(s): J44.1 - CHRONIC OBSTRUCTIVE PULMONARY DISEASE W (ACUTE) EXACERBATION   





(2) Hypoxia


Current Visit: Yes   Status: Acute   Code(s): R09.02 - HYPOXEMIA   





(3) CLL (chronic lymphocytic leukemia)


Current Visit: Yes   Status: Acute   Code(s): C91.10 - CHRONIC LYMPHOCYTIC LEUK 

OF B-CELL TYPE NOT ACHIEVE REMIS

## 2021-08-07 VITALS — DIASTOLIC BLOOD PRESSURE: 70 MMHG | SYSTOLIC BLOOD PRESSURE: 118 MMHG

## 2021-08-07 VITALS — OXYGEN SATURATION: 92 % | HEART RATE: 56 BPM

## 2021-08-07 RX ADMIN — FLUTICASONE PROPIONATE AND SALMETEROL XINAFOATE SCH PUFF: 115; 21 AEROSOL, METERED RESPIRATORY (INHALATION) at 08:13

## 2021-08-07 RX ADMIN — ALBUTEROL SULFATE SCH MG: 2.5 SOLUTION RESPIRATORY (INHALATION) at 08:14

## 2021-08-07 RX ADMIN — CARVEDILOL SCH MG: 12.5 TABLET, FILM COATED ORAL at 10:16

## 2021-08-07 RX ADMIN — AZITHROMYCIN DIHYDRATE SCH: 500 INJECTION, POWDER, LYOPHILIZED, FOR SOLUTION INTRAVENOUS at 10:21

## 2021-08-07 RX ADMIN — CEFTRIAXONE SCH: 1 INJECTION, SOLUTION INTRAVENOUS at 10:21

## 2021-08-07 RX ADMIN — ASPIRIN SCH MG: 81 TABLET, COATED ORAL at 10:16

## 2021-08-07 RX ADMIN — MAGNESIUM OXIDE TAB 400 MG (241.3 MG ELEMENTAL MG) SCH MG: 400 (241.3 MG) TAB at 10:16

## 2021-08-07 RX ADMIN — ENOXAPARIN SODIUM SCH: 100 INJECTION SUBCUTANEOUS at 10:21

## 2021-08-07 RX ADMIN — FUROSEMIDE SCH MG: 20 TABLET ORAL at 10:16

## 2021-08-07 RX ADMIN — LISINOPRIL SCH MG: 5 TABLET ORAL at 10:16

## 2021-08-07 RX ADMIN — WATER SCH MG: 1 INJECTION INTRAMUSCULAR; INTRAVENOUS; SUBCUTANEOUS at 01:21

## 2021-08-07 RX ADMIN — WATER SCH: 1 INJECTION INTRAMUSCULAR; INTRAVENOUS; SUBCUTANEOUS at 10:21

## 2021-08-07 RX ADMIN — PANTOPRAZOLE SODIUM SCH MG: 40 TABLET, DELAYED RELEASE ORAL at 10:16

## 2021-08-07 RX ADMIN — TIOTROPIUM BROMIDE SCH EA: 18 CAPSULE ORAL; RESPIRATORY (INHALATION) at 08:15

## 2021-08-07 NOTE — PCM.DS
Discharge Summary


Date of Admission: 


08/04/21 20:02





Admitting Physician: 


ANGELICA TAPIA





Primary Care Provider: 


ANGELICA TAPIA








Allergies


Allergies





No Known Drug Allergies Allergy (Verified 08/04/21 14:55)


   











Hospital Summary





- Hospital Course


Hospital Course: 








                                 Chief Complaint





Diagnosis                        COPD with exacerbation





                                    Allergies











Allergy/AdvReac Type Severity Reaction Status Date / Time


 


No Known Drug Allergies Allergy   Verified 08/04/21 14:55








                           Vital Signs (Last 24 hours)











  Temp Pulse Resp BP Pulse Ox


 


 08/07/21 08:15   56 L  18   92 L


 


 08/07/21 08:00  98.3 F  80  19  118/70  92 L


 


 08/07/21 04:00  97.4 F  80  16  136/79  93 L


 


 08/07/21 00:00  97.6 F  84  19  108/62  96


 


 08/06/21 20:00   70  20   96


 


 08/06/21 19:46  98.1 F  67  18  147/65  98


 


 08/06/21 16:00  97.2 F  103 H  18  133/83  95


 


 08/06/21 13:14   93 H  20   94 L








                                Home Medications











 Medication  Instructions  Recorded  Confirmed  Last Taken  Type


 


Albuterol Sulfate [Albuterol 8.5 gm IH Q6H 08/04/21 08/04/21 Unknown History





Sulfate Hfa]     


 


Aspirin EC 81 mg*** [Ecotrin 81 81 mg PO DAILY 08/04/21 08/04/21 08/04/21 

History





mg***]     


 


Atorvastatin Calcium 80 mg PO HS 08/04/21 08/04/21 08/03/21 History


 


Budesonide/Formoterol Fumarate 10.2 gm IH DAILY 08/04/21 08/04/21 Unknown 

History





[Symbicort 80-4.5 Mcg Inhaler]     


 


Carvedilol 12.5 mg*** [Coreg 12.5 12.5 mg PO BID 08/04/21 08/04/21 08/04/21 

History





mg***]     


 


Cholecalciferol (Vitamin D3) 25 mcg PO DAILY 08/04/21 08/04/21 08/04/21 History





[Vitamin D3]     


 


Magnesium Oxide [Magnesium] 400 mg PO DAILY 08/04/21 08/04/21 08/04/21 History


 


Omeprazole 20 mg PO BIDWM 08/04/21 08/04/21 08/04/21 History


 


Prednisone 20 mg*** [Deltasone 20 20 mg PO DAILY 08/04/21 08/04/21 Unknown 

History





mg***]     


 


Promethazine HCl 25 mg*** 12.5 mg PO Q6H PRN 08/04/21 08/04/21 08/04/21 History





[Phenergan 25 mg***]     


 


lisinopriL [Lisinopril] 5 mg PO DAILY 08/04/21 08/04/21 Unknown History


 


Furosemide 40 mg*** [Lasix 40 40 mg PO DAILY #30 tablet 08/07/21  Unknown Rx





MG***]     


 


Levofloxacin [Levaquin] 500 mg PO DAILY #5 tablet 08/07/21  Unknown Rx


 


Potassium Chloride 10 Meq Tab* 0 meq PO DAILY #30 tab 08/07/21  Unknown Rx





[Klor Con 10 MEQ***]     








                               Current Medications














Discontinued Medications














Generic Name Dose Route Start Last Admin





  Trade Name Freq  PRN Reason Stop Dose Admin


 


Acetaminophen  650 mg  08/04/21 20:11 





  Tylenol 325 Mg***  PO  09/03/21 20:10 





  Q4H PRN PRN  





  PAIN, FEVER, HEADACHE  


 


Albuterol Sulfate  Confirm  08/04/21 22:14 





  Proventil 2.5 Mg/3 Ml Neb***  Administered  08/04/21 22:15 





  Dose  





  2.5 mg  





  IH  





  .STK-MED ONE  


 


Albuterol Sulfate  2.5 mg  08/04/21 19:00  08/06/21 07:24





  Proventil 2.5 Mg/3 Ml Neb***  IH  09/03/21 18:59  Not Given





  TID JAYNA  


 


Albuterol Sulfate  2.5 mg  08/05/21 13:00  08/07/21 08:14





  Proventil 2.5 Mg/3 Ml Neb***  IH  09/03/21 18:59  2.5 mg





  TIDRT JAYNA   Administration


 


Aspirin  81 mg  08/05/21 11:00  08/07/21 10:16





  Ecotrin 81 Mg***  PO  09/04/21 10:59  81 mg





  DAILY JAYNA   Administration


 


Carvedilol  12.5 mg  08/05/21 00:00  08/07/21 10:16





  Coreg 12.5 Mg***  PO  09/04/21 00:00  12.5 mg





  BID JAYNA   Administration


 


Methylprednisolone Sodium  0 mg  08/04/21 15:27  08/04/21 15:33





Succinate 125 mg/ Sterile  IV  08/04/21 15:28  125 mg





Water 2 ml  STAT ONE   Administration


 


Methylprednisolone Sodium  0 mg  08/04/21 22:00  08/05/21 10:45





Succinate 80 mg/ Sterile Water  IV  09/03/21 21:59  Not Given





2 ml  Q8HT JAYNA  


 


Methylprednisolone Sodium  0 mg  08/05/21 10:00  08/07/21 10:21





Succinate 80 mg/ Sterile Water  IV  09/04/21 09:59  Not Given





2 ml  Q8H JAYNA  


 


Enoxaparin Sodium  40 mg  08/06/21 10:00  08/07/21 10:21





  Enoxaparin Sodium  SQ  09/05/21 09:59  Not Given





  DAILY [facility]  


 


Furosemide  20 mg  08/05/21 11:00  08/07/21 10:16





  Lasix 20 Mg***  PO  09/04/21 10:59  20 mg





  DAILY JAYNA   Administration


 


Ceftriaxone Sodium/Dextrose  1 g in 50 mls @ 100 mls/hr  08/05/21 10:00  

08/07/21 10:21





  Rocephin 1 Gm-D5w 50 Ml Bag**  IV  08/08/21 09:59  Not Given





  Q24H10 JAYNA  


 


Azithromycin  500 mg in 250 mls @ 250 mls/hr  08/05/21 10:00  08/07/21 10:21





  Zithromax 500 Mg/ 250 Ml Nacl Premix  IV  09/04/21 09:59  Not Given





  Q24H10 [facility]  


 


Lisinopril  5 mg  08/05/21 11:00  08/07/21 10:16





  Zestril 5 Mg***  PO  09/04/21 10:59  5 mg





  DAILY JAYNA   Administration


 


Magnesium Oxide  400 mg  08/05/21 11:00  08/07/21 10:16





  Mag-Ox 400***  PO  09/04/21 10:59  400 mg





  DAILY JAYNA   Administration


 


Methylprednisolone Sodium Succinate  Confirm  08/04/21 15:32 





  Solu-Medrol  Administered  08/04/21 15:33 





  Dose  





  125 mg  





  .ROUTE  





  .STK-MED ONE  


 


Methylprednisolone Sodium Succinate  Confirm  08/04/21 23:16 





  Solu-Medrol  Administered  08/04/21 23:17 





  Dose  





  125 mg  





  .ROUTE  





  .STK-MED ONE  


 


Pantoprazole Sodium  40 mg  08/05/21 00:00  08/07/21 10:16





  Protonix 40mg Tablet***  PO  09/04/21 00:00  40 mg





  BID JAYNA   Administration


 


Fluticasone/Salmeterol  2 puff  08/05/21 07:00  08/07/21 08:13





  Advair Hfa 115/21 Common Canister*  IH  09/04/21 06:59  2 puff





  BIDRT JAYNA   Administration


 


Simvastatin  80 mg  08/05/21 00:00 





  Zocor 20mg**  PO  09/04/21 00:00 





  HS JAYNA  


 


Simvastatin  40 mg  08/05/21 00:00  08/06/21 20:58





  Zocor 20mg**  PO  09/04/21 00:00  40 mg





  HS JAYNA   Administration


 


Sterile Water  Confirm  08/04/21 15:32 





  Sterile H2o 10 Ml  Administered  08/04/21 15:33 





  Dose  





  10 ml  





  IJ  





  .STK-MED ONE  


 


Sterile Water  Confirm  08/04/21 23:16 





  Sterile H2o 10 Ml  Administered  08/04/21 23:17 





  Dose  





  10 ml  





  IJ  





  .STK-MED ONE  


 


Tiotropium Bromide  1 ea  08/05/21 10:00  08/05/21 06:42





  Spiriva 18 Mcg/Cap Inhaler***    09/04/21 09:59  1 ea





  DAILY JAYNA   Administration


 


Tiotropium Bromide  1 ea  08/06/21 07:00  08/07/21 08:15





  Spiriva 18 Mcg/Cap Inhaler***    09/04/21 09:59  1 ea





  0700 JAYNA   Administration








                         Intake & Output (Last 24 hours)











 08/05/21 08/06/21 08/07/21 08/08/21





 11:59 11:59 11:59 11:59


 


Intake Total 1260 2340 1540 


 


Output Total 700 2400 250 


 


Balance 560 -60 1290 


 


Weight 86.5 kg 95.2 kg 89.5 kg 








                             Orders (Last 24 hours)











 Category Date Time Status


 


 Discharge Routine Discharge  08/07/21 Ordered














- Vitals & Intake/Output


Vital Signs: 





                                   Vital Signs











Temperature  98.3 F   08/07/21 08:00


 


Pulse Rate  56 L  08/07/21 08:15


 


Respiratory Rate  18   08/07/21 08:15


 


Blood Pressure  118/70   08/07/21 08:00


 


O2 Sat by Pulse Oximetry  92 L  08/07/21 08:15











Intake & Output: 





                                 Intake & Output











 08/05/21 08/06/21 08/07/21 08/08/21





 11:59 11:59 11:59 11:59


 


Intake Total 1260 2340 1540 


 


Output Total 700 2400 250 


 


Balance 560 -60 1290 


 


Weight 86.5 kg 95.2 kg 89.5 kg 














- Lab


Result Diagrams: 


                                 08/06/21 04:27





                                 08/06/21 04:27





- Procedures and Test


Procedures and Tests throughout Hospitalization: 





                            Therapy Orders & Screens





08/04/21 20:11


Oxygen Nasal Cannula 3 lpm 


   Comment: 


Respiratory Therapy Consult ROUTINE 


   Comment: 


   Reason For Exam: 





08/04/21 22:41


Respiratory MDI BID 


   Comment: 


   Diagnosis: COPD with exacerbation





08/04/21 22:45


Respiratory Therapy Assessment DAILY 


   Comment: 


   Diagnosis: COPD with exacerbation














Discharge Exam


General Appearance: no apparent distress, alert


Neurologic Exam: alert, oriented x 3, cooperative, normal mood/affect, nml 

cerebellar function, sensation nml, No motor deficits


Eye Exam: PERRL, EOMI, eyes nml inspection


Ears, Nose, Throat Exam: normal ENT inspection, pharynx normal, moist mucous 

membranes


Neck Exam: normal inspection, non-tender, supple, full range of motion


Respiratory Exam: normal breath sounds, lungs clear, No respiratory distress


Cardiovascular Exam: regular rate/rhythm, normal heart sounds


Gastrointestinal/Abdomen Exam: soft, No tenderness, No mass


Male Genitalia Exam: deferred


Rectal Exam: deferred


Back Exam: normal inspection, normal range of motion, No CVA tenderness, No 

vertebral tenderness


Extremity Exam: normal inspection, normal range of motion


Skin Exam: normal color, warm, dry





Final Diagnosis/Problem List





- Final Discharge Diagnosis/Problem


(1) COPD with acute exacerbation


Status: Acute   Code(s): J44.1 - CHRONIC OBSTRUCTIVE PULMONARY DISEASE W (ACUTE)

EXACERBATION   





(2) CLL (chronic lymphocytic leukemia)


Status: Acute   Priority: High   


Assessment & Plan: 





                                Medication Report








Discontinued Medications





Albuterol Sulfate (Proventil 2.5 Mg/3 Ml Neb***)  2.5 mg IH TID JAYNA


   Stop: 09/03/21 18:59


   Last Admin: 08/05/21 06:42  Dose: 2.5 mg


   Documented by: IVANA





Nebulizer Treatment


 Document     08/05/21 06:42  IVANA  (Rec: 08/05/21 06:43  IVANA  

[id number])


     Therapy


      Aerosol Therapy                            Initial Aerosol Therapy


     Treatment


      Method                                     Nebulizer,Mask


      Treatment Tolerance                        Good





Albuterol Sulfate (Proventil 2.5 Mg/3 Ml Neb***)  2.5 mg IH TIDRT JAYNA


   Stop: 09/03/21 18:59


   Last Admin: 08/07/21 08:14  Dose: 2.5 mg


   Documented by: MAINORFE





Nebulizer Treatment


 Document     08/07/21 08:14  TW  (Rec: 08/07/21 08:14  TW  [id number])


     Therapy


      Aerosol Therapy                            Initial Aerosol Therapy


     Treatment


      Method                                     Nebulizer,Mask


      Treatment Tolerance                        Good





Aspirin (Ecotrin 81 Mg***)  81 mg PO DAILY JAYNA


   Stop: 09/04/21 10:59


   Last Admin: 08/07/21 10:16  Dose: 81 mg


   Documented by: OLIVIA





Carvedilol (Coreg 12.5 Mg***)  12.5 mg PO BID [facility]


   Stop: 09/04/21 00:00


   Last Admin: 08/07/21 10:16  Dose: 12.5 mg


   Documented by: OLIVIA





Methylprednisolone Sodium Succinate 125 mg/ Sterile Water 2 ml  0 mg IV STAT ONE


   Stop: 08/04/21 15:28


   Last Admin: 08/04/21 15:33  Dose: 125 mg


   Documented by: MAXORGAN





Med Admininistration (IV,IVP)


 Document     08/04/21 15:33  KM  (Rec: 08/04/21 15:33  KM  [id number])


     Type of Administration


      Initial IV Push                            Yes





Methylprednisolone Sodium Succinate 80 mg/ Sterile Water 2 ml  0 mg IV Q8HT [facility]


   Stop: 09/03/21 21:59


   Last Admin: 08/05/21 10:45 Dose:  Not Given


   Documented by: TELLO


   Non-Admin Reason: changed





Methylprednisolone Sodium Succinate 80 mg/ Sterile Water 2 ml  0 mg IV Q8H JAYNA


   Stop: 09/04/21 09:59


   Last Admin: 08/07/21 10:21 Dose:  Not Given


   Documented by: OLIVIA


   Non-Admin Reason: No IV Access





Enoxaparin Sodium (Enoxaparin Sodium)  40 mg SQ DAILY [facility]


   Stop: 09/05/21 09:59


   Last Admin: 08/07/21 10:21 Dose:  Not Given


   Documented by: OLIVIA


   Non-Admin Reason: Patient Refused





Furosemide (Lasix 20 Mg***)  20 mg PO DAILY JAYNA


   Stop: 09/04/21 10:59


   Last Admin: 08/07/21 10:16  Dose: 20 mg


   Documented by: OLIVIA





Ceftriaxone Sodium/Dextrose (Rocephin 1 Gm-D5w 50 Ml Bag**)  1 g in 50 mls @ 100

mls/hr IV Q24H10 JAYNA


   Stop: 08/08/21 09:59


   Last Admin: 08/07/21 10:21 Dose:  Not Given


   Documented by: OLIVIA


   Non-Admin Reason: No IV Access





Azithromycin (Zithromax 500 Mg/ 250 Ml Nacl Premix)  500 mg in 250 mls @ 250 

mls/hr IV Q24H10 JAYNA


   Stop: 09/04/21 09:59


   Last Admin: 08/07/21 10:21 Dose:  Not Given


   Documented by: OLIVIA


   Non-Admin Reason: No IV Access





Lisinopril (Zestril 5 Mg***)  5 mg PO DAILY JAYNA


   Stop: 09/04/21 10:59


   Last Admin: 08/07/21 10:16  Dose: 5 mg


   Documented by: OLIVIA





Magnesium Oxide (Mag-Ox 400***)  400 mg PO DAILY JAYNA


   Stop: 09/04/21 10:59


   Last Admin: 08/07/21 10:16  Dose: 400 mg


   Documented by: OLIVIA





Pantoprazole Sodium (Protonix 40mg Tablet***)  40 mg PO BID JAYNA


   Stop: 09/04/21 00:00


   Last Admin: 08/07/21 10:16  Dose: 40 mg


   Documented by: OLIVIA





Fluticasone/Salmeterol (Advair Hfa 115/21 Common Canister*)  2 puff IH BIDRT JAYNA


   Stop: 09/04/21 06:59


   Last Admin: 08/07/21 08:13  Dose: 2 puff


   Documented by: RAFAEL Pazair MDI


 Document     08/07/21 08:13  TW  (Rec: 08/07/21 08:13  TW  [id number])


     MDI


      MDI                                        Initial MDI


      Advair 115/21                              2 Puffs


      Spacer Used                                Yes


      Rinsed Mouth After MDI-RT                  Yes





Simvastatin (Zocor 20mg**)  40 mg PO HS [facility]


   Stop: 09/04/21 00:00


   Last Admin: 08/06/21 20:58  Dose: 40 mg


   Documented by: NATACHA





Tiotropium Bromide (Spiriva 18 Mcg/Cap Inhaler***)  1 ea IH DAILY JAYNA


   Stop: 09/04/21 09:59


   Last Admin: 08/05/21 06:42  Dose: 1 ea


   Documented by: IVANA





MDI


 Document     08/05/21 06:42  IVANA  (Rec: 08/05/21 06:45  IVANA  

[id number])


     MDI


      MDI                                        Subsequent MDI


      Spacer Used                                No


      Rinsed Mouth After MDI                     Yes





Tiotropium Bromide (Spiriva 18 Mcg/Cap Inhaler***)  1 ea IH 0700 [facility]


   Stop: 09/04/21 09:59


   Last Admin: 08/07/21 08:15  Dose: 1 ea


   Documented by: RAFAEL





MDI


 Document     08/07/21 08:15  TW  (Rec: 08/07/21 08:15  TW  [id number])


     MDI


      MDI                                        Subsequent MDI


      Rinsed Mouth After MDI                     Yes











                                 Chief Complaint





Diagnosis                        COPD with exacerbation





                                    Allergies











Allergy/AdvReac Type Severity Reaction Status Date / Time


 


No Known Drug Allergies Allergy   Verified 08/04/21 14:55








                           Vital Signs (Last 24 hours)











  Temp Pulse Resp BP Pulse Ox


 


 08/07/21 08:15   56 L  18   92 L


 


 08/07/21 08:00  98.3 F  80  19  118/70  92 L


 


 08/07/21 04:00  97.4 F  80  16  136/79  93 L


 


 08/07/21 00:00  97.6 F  84  19  108/62  96


 


 08/06/21 20:00   70  20   96


 


 08/06/21 19:46  98.1 F  67  18  147/65  98


 


 08/06/21 16:00  97.2 F  103 H  18  133/83  95


 


 08/06/21 13:14   93 H  20   94 L








                                Home Medications











 Medication  Instructions  Recorded  Confirmed  Last Taken  Type


 


Albuterol Sulfate [Albuterol 8.5 gm IH Q6H 08/04/21 08/04/21 Unknown History





Sulfate Hfa]     


 


Aspirin EC 81 mg*** [Ecotrin 81 81 mg PO DAILY 08/04/21 08/04/21 08/04/21 

History





mg***]     


 


Atorvastatin Calcium 80 mg PO HS 08/04/21 08/04/21 08/03/21 History


 


Budesonide/Formoterol Fumarate 10.2 gm IH DAILY 08/04/21 08/04/21 Unknown 

History





[Symbicort 80-4.5 Mcg Inhaler]     


 


Carvedilol 12.5 mg*** [Coreg 12.5 12.5 mg PO BID 08/04/21 08/04/21 08/04/21 

History





mg***]     


 


Cholecalciferol (Vitamin D3) 25 mcg PO DAILY 08/04/21 08/04/21 08/04/21 History





[Vitamin D3]     


 


Magnesium Oxide [Magnesium] 400 mg PO DAILY 08/04/21 08/04/21 08/04/21 History


 


Omeprazole 20 mg PO BIDWM 08/04/21 08/04/21 08/04/21 History


 


Prednisone 20 mg*** [Deltasone 20 20 mg PO DAILY 08/04/21 08/04/21 Unknown 

History





mg***]     


 


Promethazine HCl 25 mg*** 12.5 mg PO Q6H PRN 08/04/21 08/04/21 08/04/21 History





[Phenergan 25 mg***]     


 


lisinopriL [Lisinopril] 5 mg PO DAILY 08/04/21 08/04/21 Unknown History


 


Furosemide 40 mg*** [Lasix 40 40 mg PO DAILY #30 tablet 08/07/21  Unknown Rx





MG***]     


 


Levofloxacin [Levaquin] 500 mg PO DAILY #5 tablet 08/07/21  Unknown Rx


 


Potassium Chloride 10 Meq Tab* 0 meq PO DAILY #30 tab 08/07/21  Unknown Rx





[Klor Con 10 MEQ***]     








                               Current Medications














Discontinued Medications














Generic Name Dose Route Start Last Admin





  Trade Name Freq  PRN Reason Stop Dose Admin


 


Acetaminophen  650 mg  08/04/21 20:11 





  Tylenol 325 Mg***  PO  09/03/21 20:10 





  Q4H PRN PRN  





  PAIN, FEVER, HEADACHE  


 


Albuterol Sulfate  Confirm  08/04/21 22:14 





  Proventil 2.5 Mg/3 Ml Neb***  Administered  08/04/21 22:15 





  Dose  





  2.5 mg  





  IH  





  .STK-MED ONE  


 


Albuterol Sulfate  2.5 mg  08/04/21 19:00  08/06/21 07:24





  Proventil 2.5 Mg/3 Ml Neb***  IH  09/03/21 18:59  Not Given





  TID JAYNA  


 


Albuterol Sulfate  2.5 mg  08/05/21 13:00  08/07/21 08:14





  Proventil 2.5 Mg/3 Ml Neb***  IH  09/03/21 18:59  2.5 mg





  TIDRT JAYNA   Administration


 


Aspirin  81 mg  08/05/21 11:00  08/07/21 10:16





  Ecotrin 81 Mg***  PO  09/04/21 10:59  81 mg





  DAILY JAYNA   Administration


 


Carvedilol  12.5 mg  08/05/21 00:00  08/07/21 10:16





  Coreg 12.5 Mg***  PO  09/04/21 00:00  12.5 mg





  BID JAYNA   Administration


 


Methylprednisolone Sodium  0 mg  08/04/21 15:27  08/04/21 15:33





Succinate 125 mg/ Sterile  IV  08/04/21 15:28  125 mg





Water 2 ml  STAT ONE   Administration


 


Methylprednisolone Sodium  0 mg  08/04/21 22:00  08/05/21 10:45





Succinate 80 mg/ Sterile Water  IV  09/03/21 21:59  Not Given





2 ml  Q8HT JAYNA  


 


Methylprednisolone Sodium  0 mg  08/05/21 10:00  08/07/21 10:21





Succinate 80 mg/ Sterile Water  IV  09/04/21 09:59  Not Given





2 ml  Q8H JAYNA  


 


Enoxaparin Sodium  40 mg  08/06/21 10:00  08/07/21 10:21





  Enoxaparin Sodium  SQ  09/05/21 09:59  Not Given





  DAILY JAYNA  


 


Furosemide  20 mg  08/05/21 11:00  08/07/21 10:16





  Lasix 20 Mg***  PO  09/04/21 10:59  20 mg





  DAILY JAYNA   Administration


 


Ceftriaxone Sodium/Dextrose  1 g in 50 mls @ 100 mls/hr  08/05/21 10:00  

08/07/21 10:21





  Rocephin 1 Gm-D5w 50 Ml Bag**  IV  08/08/21 09:59  Not Given





  Q24H10 JAYNA  


 


Azithromycin  500 mg in 250 mls @ 250 mls/hr  08/05/21 10:00  08/07/21 10:21





  Zithromax 500 Mg/ 250 Ml Nacl Premix  IV  09/04/21 09:59  Not Given





  Q24H10 JAYNA  


 


Lisinopril  5 mg  08/05/21 11:00  08/07/21 10:16





  Zestril 5 Mg***  PO  09/04/21 10:59  5 mg





  DAILY JAYNA   Administration


 


Magnesium Oxide  400 mg  08/05/21 11:00  08/07/21 10:16





  Mag-Ox 400***  PO  09/04/21 10:59  400 mg





  DAILY JAYNA   Administration


 


Methylprednisolone Sodium Succinate  Confirm  08/04/21 15:32 





  Solu-Medrol  Administered  08/04/21 15:33 





  Dose  





  125 mg  





  .ROUTE  





  .STK-MED ONE  


 


Methylprednisolone Sodium Succinate  Confirm  08/04/21 23:16 





  Solu-Medrol  Administered  08/04/21 23:17 





  Dose  





  125 mg  





  .ROUTE  





  .STK-MED ONE  


 


Pantoprazole Sodium  40 mg  08/05/21 00:00  08/07/21 10:16





  Protonix 40mg Tablet***  PO  09/04/21 00:00  40 mg





  BID JAYNA   Administration


 


Fluticasone/Salmeterol  2 puff  08/05/21 07:00  08/07/21 08:13





  Advair Hfa 115/21 Common Canister*  IH  09/04/21 06:59  2 puff





  BIDRT JAYNA   Administration


 


Simvastatin  80 mg  08/05/21 00:00 





  Zocor 20mg**  PO  09/04/21 00:00 





  HS JAYNA  


 


Simvastatin  40 mg  08/05/21 00:00  08/06/21 20:58





  Zocor 20mg**  PO  09/04/21 00:00  40 mg





  HS JAYNA   Administration


 


Sterile Water  Confirm  08/04/21 15:32 





  Sterile H2o 10 Ml  Administered  08/04/21 15:33 





  Dose  





  10 ml  





  IJ  





  .STK-MED ONE  


 


Sterile Water  Confirm  08/04/21 23:16 





  Sterile H2o 10 Ml  Administered  08/04/21 23:17 





  Dose  





  10 ml  





  IJ  





  .STK-MED ONE  


 


Tiotropium Bromide  1 ea  08/05/21 10:00  08/05/21 06:42





  Spiriva 18 Mcg/Cap Inhaler***    09/04/21 09:59  1 ea





  DAILY JAYNA   Administration


 


Tiotropium Bromide  1 ea  08/06/21 07:00  08/07/21 08:15





  Spiriva 18 Mcg/Cap Inhaler***    09/04/21 09:59  1 ea





  0700 JAYNA   Administration








                         Intake & Output (Last 24 hours)











 08/05/21 08/06/21 08/07/21 08/08/21





 11:59 11:59 11:59 11:59


 


Intake Total 1260 2340 1540 


 


Output Total 700 2400 250 


 


Balance 560 -60 1290 


 


Weight 86.5 kg 95.2 kg 89.5 kg 








                             Orders (Last 24 hours)











 Category Date Time Status


 


 Discharge Routine Discharge  08/07/21 Ordered











Code(s): C91.10 - CHRONIC LYMPHOCYTIC LEUK OF B-CELL TYPE NOT ACHIEVE REMIS   





(3) Hypoxia


Status: Acute   Code(s): R09.02 - HYPOXEMIA   





- Discharge


Discharge Date: 08/07/21


Disposition: Home, Self-Care


Condition: Stable


Prescriptions: 


New


   Potassium Chloride 10 Meq Tab* [Klor Con 10 MEQ***] 0 meq PO DAILY #30 tab


   Furosemide 40 mg*** [Lasix 40 MG***] 40 mg PO DAILY #30 tablet


   Levofloxacin [Levaquin] 500 mg PO DAILY #5 tablet





Continue


   Prednisone 20 mg*** [Deltasone 20 mg***] 20 mg PO DAILY


   lisinopriL [Lisinopril] 5 mg PO DAILY


   Albuterol Sulfate [Albuterol Sulfate Hfa] 8.5 gm IH Q6H


   Budesonide/Formoterol Fumarate [Symbicort 80-4.5 Mcg Inhaler] 10.2 gm IH 

DAILY


   Promethazine HCl 25 mg*** [Phenergan 25 mg***] 12.5 mg PO Q6H PRN


     PRN Reason: Nausea


   Omeprazole 20 mg PO BIDWM


   Magnesium Oxide [Magnesium] 400 mg PO DAILY


   Cholecalciferol (Vitamin D3) [Vitamin D3] 25 mcg PO DAILY


   Carvedilol 12.5 mg*** [Coreg 12.5 mg***] 12.5 mg PO BID


   Atorvastatin Calcium 80 mg PO HS


   Aspirin EC 81 mg*** [Ecotrin 81 mg***] 81 mg PO DAILY





Discontinued


   Furosemide 20 mg*** [Lasix 20 mg***] 20 mg PO DAILY


Instructions:  Chronic Obstructive Pulmonary Disease (COPD) (DC)


Follow up with: 


ANGELICA TAPIA MD [Primary Care Provider] - 08/13/21 1:30 pm